# Patient Record
Sex: FEMALE | Race: WHITE
[De-identification: names, ages, dates, MRNs, and addresses within clinical notes are randomized per-mention and may not be internally consistent; named-entity substitution may affect disease eponyms.]

---

## 2017-05-22 ENCOUNTER — HOSPITAL ENCOUNTER (OUTPATIENT)
Dept: HOSPITAL 44 - LAB | Age: 57
End: 2017-05-22
Attending: FAMILY MEDICINE
Payer: COMMERCIAL

## 2017-05-22 DIAGNOSIS — E78.2: Primary | ICD-10-CM

## 2017-05-22 PROCEDURE — 80061 LIPID PANEL: CPT

## 2017-05-22 PROCEDURE — 36415 COLL VENOUS BLD VENIPUNCTURE: CPT

## 2017-12-27 ENCOUNTER — HOSPITAL ENCOUNTER (OUTPATIENT)
Dept: HOSPITAL 44 - LAB | Age: 57
End: 2017-12-27
Attending: FAMILY MEDICINE
Payer: COMMERCIAL

## 2017-12-27 DIAGNOSIS — Z00.00: Primary | ICD-10-CM

## 2017-12-27 LAB
BASOPHILS NFR BLD: 0.7 % (ref 0–1.5)
EGFR (AFRICAN): > 60
EGFR (NON-AFRICAN): > 60
EOSINOPHIL NFR BLD: 4.2 % (ref 0–6.8)
MCH RBC QN AUTO: 29.6 PG (ref 28–34)
MCV RBC AUTO: 88.4 FL (ref 80–100)
MONOCYTES %: 3.9 % (ref 0–11)
NEUTROPHILS #: 3 # K/UL (ref 1.4–7.7)

## 2017-12-27 PROCEDURE — 36415 COLL VENOUS BLD VENIPUNCTURE: CPT

## 2017-12-27 PROCEDURE — 80061 LIPID PANEL: CPT

## 2017-12-27 PROCEDURE — 85025 COMPLETE CBC W/AUTO DIFF WBC: CPT

## 2017-12-27 PROCEDURE — 80053 COMPREHEN METABOLIC PANEL: CPT

## 2018-09-11 ENCOUNTER — HOSPITAL ENCOUNTER (OUTPATIENT)
Dept: HOSPITAL 44 - RAD | Age: 58
End: 2018-09-11
Attending: NURSE PRACTITIONER
Payer: COMMERCIAL

## 2018-09-11 DIAGNOSIS — M79.675: Primary | ICD-10-CM

## 2018-09-11 PROCEDURE — 73630 X-RAY EXAM OF FOOT: CPT

## 2018-09-11 NOTE — DIAGNOSTIC IMAGING REPORT
ENOCH CHAMORRO 

[facility]

[address] [facility] [address] [address]

 

 

 

 

Report Submission Date: Sep 11, 2018 11:07:18 AM CDT

Patient       Study

Name:   MENDOZA BURROWS       Date:   Sep 11, 2018 10:23:35 AM CDT

MRN:   [id number]       Modality Type:   DX

Gender:   F       Description:   LOWER EXTREMITY

:   56       Institution:   [facility]

Physician:   ENOCH CHAMORRO

     Accession:    [id number]

 

 

Examination: Plain film left foot   



History:  PAIN IN 5TH DIGIT (Hx) 



Findings: 3 views of the left foot demonstrates mild articular degenerative 
spurring. No fracture or dislocation. Specifically 5th digit without acute 
cortical abnormality. Axial spur.   No soft tissue swelling. No joint effusion. 



Impression: Degenerative changes without fracture.

 

Electronically signed on Sep 11, 2018 11:07:18 AM CDT by:

Amando ONEAL

## 2019-01-21 ENCOUNTER — HOSPITAL ENCOUNTER (OUTPATIENT)
Dept: HOSPITAL 44 - OPSURG | Age: 59
End: 2019-01-21
Attending: INTERNAL MEDICINE
Payer: COMMERCIAL

## 2019-01-21 DIAGNOSIS — Z12.11: Primary | ICD-10-CM

## 2019-01-21 DIAGNOSIS — Z80.0: ICD-10-CM

## 2019-01-21 DIAGNOSIS — K57.30: ICD-10-CM

## 2019-01-21 PROCEDURE — S1016 NON-PVC INTRAVENOUS ADMINIST: HCPCS

## 2019-01-21 PROCEDURE — 45378 DIAGNOSTIC COLONOSCOPY: CPT

## 2019-01-21 NOTE — GI REPORT
REFERRING PHYSICIAN:

Dr. Nichole Yeboah

   

GASTROENTEROLOGIST: 

Donald Gerhardt, MD



PROCEDURE MEDICATION:

Propofol as per anesthesia.   



INDICATIONS: 

This 58-year-old woman is referred for a screening.   A second-degree relative, 
grandparent, has a history of colon cancer.   Patient comes for an evaluation.  
She denies any change in bowel habits or obvious bleeding.   She has had C-
sections and an open cholecystectomy as far as abdominal surgeries.  She is 5 
feet 4 inches and her weight is 190 and she carries that centrally.  



PROCEDURE PERFORMED: 

Colonoscopy.



PROCEDURE: 

An Olympus video colonoscope was advanced to the rectum and slowly advanced all 
the way to the cecum.  The appendiceal orifice and ileocecal valve were normal. 
On slow withdrawal, the cecum, ascending colon with no obvious intraluminal 
lesions noted.  The transverse colon, again, no obvious intraluminal lesions 
noted.  The descending colon with a few scattered diverticula.   No obvious 
intraluminal lesions noted.  The sigmoid had some redundancy and a few 
diverticula.   Retroflexion of the rectum was normal.  Patient tolerated the 
procedure well. 



FINDINGS: 

1.    Mild diverticular disease of the sigmoid and distal descending colon.

2.    No obvious intraluminal lesions noted.



RECOMMENDATIONS: 

1.   A high-fiber diet. 

2.   Consider re-looking at her colon in 7 to 10 years since she has a second-
degree relative with colon cancer, or unless clinically indicated to do sooner. 






cc:   Dr. Nichole ONEAL

## 2019-10-10 ENCOUNTER — HOSPITAL ENCOUNTER (OUTPATIENT)
Dept: HOSPITAL 44 - RAD | Age: 59
End: 2019-10-10
Attending: FAMILY MEDICINE
Payer: COMMERCIAL

## 2019-10-10 DIAGNOSIS — M25.561: Primary | ICD-10-CM

## 2019-10-10 PROCEDURE — 73562 X-RAY EXAM OF KNEE 3: CPT

## 2019-11-01 NOTE — DIAGNOSTIC IMAGING REPORT
RALPH PIKE 

[facility]

[address] [facility].[address] [address]

 

 

 

 

Report Submission Date: Oct 10, 2019 3:53:11 PM CDT

Patient       Study

Name:   MENDOZA BURROWS       Date:   Oct 10, 2019 3:24:47 PM CDT

MRN:   [id number]       Modality Type:   DX

Gender:   F       Description:   KNEE 1 OR 2 VIEWS

:   56       Institution:   [facility]

Physician:   RALPH PIKE

     Accession:    [id number]

 

 

Exam:  Right knee.  



History:  Pain.  



AP and lateral view of the right knee are submitted.  No signs of fracture or 
dislocation is seen.  No bony erosions are seen.  No soft tissue abnormalities 
identified.  



Impression:

No bony abnormality.

 

Electronically signed on Oct 10, 2019 3:53:11 PM CDT by:

Primitivo ONEAL